# Patient Record
Sex: MALE | Race: BLACK OR AFRICAN AMERICAN | NOT HISPANIC OR LATINO | URBAN - METROPOLITAN AREA
[De-identification: names, ages, dates, MRNs, and addresses within clinical notes are randomized per-mention and may not be internally consistent; named-entity substitution may affect disease eponyms.]

---

## 2020-01-10 ENCOUNTER — OFFICE (OUTPATIENT)
Dept: URBAN - METROPOLITAN AREA CLINIC 19 | Facility: CLINIC | Age: 40
End: 2020-01-10

## 2020-01-10 VITALS
DIASTOLIC BLOOD PRESSURE: 65 MMHG | SYSTOLIC BLOOD PRESSURE: 118 MMHG | HEIGHT: 75 IN | HEART RATE: 67 BPM | WEIGHT: 239 LBS

## 2020-01-10 DIAGNOSIS — K51.011 ULCERATIVE (CHRONIC) PANCOLITIS WITH RECTAL BLEEDING: ICD-10-CM

## 2020-01-10 DIAGNOSIS — R19.4 CHANGE IN BOWEL HABIT: ICD-10-CM

## 2020-01-10 LAB
C-REACTIVE PROTEIN, QUANT: 10 MG/L (ref 0–10)
CBC, PLATELET, NO DIFFERENTIAL: HEMATOCRIT: 43.7 % (ref 37.5–51)
CBC, PLATELET, NO DIFFERENTIAL: HEMOGLOBIN: 14.5 G/DL (ref 13–17.7)
CBC, PLATELET, NO DIFFERENTIAL: MCH: 30.6 PG (ref 26.6–33)
CBC, PLATELET, NO DIFFERENTIAL: MCHC: 33.2 G/DL (ref 31.5–35.7)
CBC, PLATELET, NO DIFFERENTIAL: MCV: 92 FL (ref 79–97)
CBC, PLATELET, NO DIFFERENTIAL: PLATELETS: 199 X10E3/UL (ref 150–450)
CBC, PLATELET, NO DIFFERENTIAL: RBC: 4.74 X10E6/UL (ref 4.14–5.8)
CBC, PLATELET, NO DIFFERENTIAL: RDW: 14.5 % (ref 11.6–15.4)
CBC, PLATELET, NO DIFFERENTIAL: WBC: 6.8 X10E3/UL (ref 3.4–10.8)
COMP. METABOLIC PANEL (14): A/G RATIO: 1.4 (ref 1.2–2.2)
COMP. METABOLIC PANEL (14): ALBUMIN: 4.3 G/DL (ref 3.5–5.5)
COMP. METABOLIC PANEL (14): ALKALINE PHOSPHATASE: 44 IU/L (ref 39–117)
COMP. METABOLIC PANEL (14): ALT (SGPT): 27 IU/L (ref 0–44)
COMP. METABOLIC PANEL (14): AST (SGOT): 21 IU/L (ref 0–40)
COMP. METABOLIC PANEL (14): BILIRUBIN, TOTAL: 0.4 MG/DL (ref 0–1.2)
COMP. METABOLIC PANEL (14): BUN/CREATININE RATIO: 18 (ref 9–20)
COMP. METABOLIC PANEL (14): BUN: 21 MG/DL — HIGH (ref 6–20)
COMP. METABOLIC PANEL (14): CALCIUM: 9.5 MG/DL (ref 8.7–10.2)
COMP. METABOLIC PANEL (14): CARBON DIOXIDE, TOTAL: 23 MMOL/L (ref 20–29)
COMP. METABOLIC PANEL (14): CHLORIDE: 104 MMOL/L (ref 96–106)
COMP. METABOLIC PANEL (14): CREATININE: 1.14 MG/DL (ref 0.76–1.27)
COMP. METABOLIC PANEL (14): EGFR IF AFRICN AM: 93 ML/MIN/1.73 (ref 59–?)
COMP. METABOLIC PANEL (14): EGFR IF NONAFRICN AM: 81 ML/MIN/1.73 (ref 59–?)
COMP. METABOLIC PANEL (14): GLOBULIN, TOTAL: 3.1 G/DL (ref 1.5–4.5)
COMP. METABOLIC PANEL (14): GLUCOSE: 90 MG/DL (ref 65–99)
COMP. METABOLIC PANEL (14): POTASSIUM: 4.8 MMOL/L (ref 3.5–5.2)
COMP. METABOLIC PANEL (14): PROTEIN, TOTAL: 7.4 G/DL (ref 6–8.5)
COMP. METABOLIC PANEL (14): SODIUM: 142 MMOL/L (ref 134–144)
SEDIMENTATION RATE-WESTERGREN: 7 MM/HR (ref 0–15)

## 2020-01-10 PROCEDURE — 99204 OFFICE O/P NEW MOD 45 MIN: CPT | Performed by: INTERNAL MEDICINE

## 2020-01-10 RX ORDER — SODIUM PICOSULFATE, MAGNESIUM OXIDE, AND ANHYDROUS CITRIC ACID 10; 3.5; 12 MG/160ML; G/160ML; G/160ML
LIQUID ORAL
Qty: 1 | Refills: 0 | Status: COMPLETED
Start: 2020-01-10 | End: 2020-01-16

## 2020-01-10 RX ORDER — MESALAMINE 4 G/60ML
4 ENEMA RECTAL
Qty: 15 | Refills: 2 | Status: COMPLETED
Start: 2020-01-10 | End: 2020-04-30

## 2020-01-10 NOTE — SERVICEHPINOTES
39-year-old black male with a history of PACO returns after I last saw him in 2013, complaining of increasing intestinal "gas", increase in stool frequency after eating and rectal bleeding for the past 3 months.  He denies nausea, reflux, dysphagia or melena.  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  Random biopsies documented mild colitis involving the transverse and descending colon.  L-sided PACO was diagnosed initially on colonoscopy 12/16/04 and his clinical course has been quite unremarkable. He has never been on steroids or been hospitalized. He has had only a few minor flareups in the past , which were successfully treated with a short course of Canasa supp. He's never been on any maintenance IBD medication.  He denies any extraintestinal manifestations of IBD.  FHx is negative for IBD or colon neoplasm.

## 2020-01-16 ENCOUNTER — OFFICE (OUTPATIENT)
Dept: URBAN - METROPOLITAN AREA PATHOLOGY 22 | Facility: PATHOLOGY | Age: 40
End: 2020-01-16
Payer: COMMERCIAL

## 2020-01-16 ENCOUNTER — AMBULATORY SURGICAL CENTER (OUTPATIENT)
Dept: URBAN - METROPOLITAN AREA SURGERY 3 | Facility: SURGERY | Age: 40
End: 2020-01-16
Payer: COMMERCIAL

## 2020-01-16 ENCOUNTER — AMBULATORY SURGICAL CENTER (OUTPATIENT)
Dept: URBAN - METROPOLITAN AREA SURGERY 3 | Facility: SURGERY | Age: 40
End: 2020-01-16

## 2020-01-16 VITALS
SYSTOLIC BLOOD PRESSURE: 130 MMHG | DIASTOLIC BLOOD PRESSURE: 70 MMHG | SYSTOLIC BLOOD PRESSURE: 138 MMHG | DIASTOLIC BLOOD PRESSURE: 70 MMHG | OXYGEN SATURATION: 99 % | OXYGEN SATURATION: 99 % | RESPIRATION RATE: 17 BRPM | HEART RATE: 58 BPM | TEMPERATURE: 97.5 F | RESPIRATION RATE: 18 BRPM | HEART RATE: 58 BPM | RESPIRATION RATE: 16 BRPM | DIASTOLIC BLOOD PRESSURE: 85 MMHG | HEIGHT: 75 IN | OXYGEN SATURATION: 100 % | HEART RATE: 62 BPM | SYSTOLIC BLOOD PRESSURE: 147 MMHG | DIASTOLIC BLOOD PRESSURE: 70 MMHG | DIASTOLIC BLOOD PRESSURE: 76 MMHG | WEIGHT: 239 LBS | RESPIRATION RATE: 17 BRPM | OXYGEN SATURATION: 99 % | TEMPERATURE: 97.5 F | DIASTOLIC BLOOD PRESSURE: 85 MMHG | OXYGEN SATURATION: 100 % | HEART RATE: 62 BPM | HEART RATE: 65 BPM | SYSTOLIC BLOOD PRESSURE: 147 MMHG | DIASTOLIC BLOOD PRESSURE: 76 MMHG | RESPIRATION RATE: 16 BRPM | RESPIRATION RATE: 18 BRPM | DIASTOLIC BLOOD PRESSURE: 85 MMHG | DIASTOLIC BLOOD PRESSURE: 87 MMHG | RESPIRATION RATE: 16 BRPM | WEIGHT: 239 LBS | TEMPERATURE: 97.5 F | HEART RATE: 65 BPM | SYSTOLIC BLOOD PRESSURE: 130 MMHG | DIASTOLIC BLOOD PRESSURE: 87 MMHG | SYSTOLIC BLOOD PRESSURE: 138 MMHG | RESPIRATION RATE: 17 BRPM | HEIGHT: 75 IN | HEIGHT: 75 IN | SYSTOLIC BLOOD PRESSURE: 130 MMHG | SYSTOLIC BLOOD PRESSURE: 138 MMHG | WEIGHT: 239 LBS | HEART RATE: 58 BPM | HEART RATE: 65 BPM | SYSTOLIC BLOOD PRESSURE: 147 MMHG | OXYGEN SATURATION: 100 % | RESPIRATION RATE: 18 BRPM | HEART RATE: 62 BPM | DIASTOLIC BLOOD PRESSURE: 76 MMHG | DIASTOLIC BLOOD PRESSURE: 87 MMHG

## 2020-01-16 DIAGNOSIS — K62.5 HEMORRHAGE OF ANUS AND RECTUM: ICD-10-CM

## 2020-01-16 DIAGNOSIS — K51.90 ULCERATIVE COLITIS, UNSPECIFIED, WITHOUT COMPLICATIONS: ICD-10-CM

## 2020-01-16 PROCEDURE — 88342 IMHCHEM/IMCYTCHM 1ST ANTB: CPT | Performed by: INTERNAL MEDICINE

## 2020-01-16 PROCEDURE — 88305 TISSUE EXAM BY PATHOLOGIST: CPT | Performed by: INTERNAL MEDICINE

## 2020-01-16 PROCEDURE — 45380 COLONOSCOPY AND BIOPSY: CPT | Performed by: INTERNAL MEDICINE

## 2020-01-16 PROCEDURE — 88341 IMHCHEM/IMCYTCHM EA ADD ANTB: CPT | Performed by: INTERNAL MEDICINE

## 2020-03-03 PROBLEM — K52.89 INFLAMMATORY BOWEL DISEASE OF THE INTESTINE IF MORE PRECISE DIAGNOSIS OR DETERMINATION OF THE EXTENT/SEVERITY OF ACTIVITY OF DISEASE WILL INFLUENCE IMMEDIATE/FUTURE MANAGEMENT: Status: ACTIVE | Noted: 2020-01-16

## 2020-04-30 ENCOUNTER — OFFICE (OUTPATIENT)
Dept: URBAN - METROPOLITAN AREA TELEHEALTH 10 | Facility: TELEHEALTH | Age: 40
End: 2020-04-30

## 2020-04-30 VITALS — HEIGHT: 75 IN

## 2020-04-30 DIAGNOSIS — K51.011 ULCERATIVE (CHRONIC) PANCOLITIS WITH RECTAL BLEEDING: ICD-10-CM

## 2020-04-30 RX ORDER — MESALAMINE 375 MG/1
1.5 CAPSULE, EXTENDED RELEASE ORAL
Qty: 360 | Refills: 3 | Status: ACTIVE
Start: 2020-01-23

## 2020-04-30 NOTE — SERVICEHPINOTES
40-year-old black male with a history of PACO with [facility] follow-up after starting Apriso 4 PACO.  Colonoscopy 01/16/2020 found moderate proctosigmoiditis.  He was started on Apriso.  He is now asymptomatic ( symptoms of increasing intestinal "gas", increase in stool frequency after eating and rectal bleeding have resolved).  He no longer uses mesalamine enemas.  He is seeing a cardiologist  for the BBB discovered during his colonoscopy.L-sided PACO was diagnosed initially on colonoscopy 12/16/04 and confirmed on colonoscopy 07/30/2013.  His clinical course has been quite unremarkable overall. He has never been on steroids or been hospitalized. He has had only a few minor flareups in the past, which were successfully treated with a short course of Canasa supp. He's never been on any maintenance IBD medication before starting Apriso 3 months ago. He denies any extraintestinal manifestations of IBD. FHx is negative for IBD or colon neoplasm.

## 2021-10-08 ENCOUNTER — OFFICE (OUTPATIENT)
Dept: URBAN - METROPOLITAN AREA CLINIC 19 | Facility: CLINIC | Age: 41
End: 2021-10-08

## 2021-10-08 VITALS — HEIGHT: 75 IN

## 2021-10-08 DIAGNOSIS — K51.319 ULCERATIVE (CHRONIC) RECTOSIGMOIDITIS WITH UNSPECIFIED COMPL: ICD-10-CM

## 2021-10-08 RX ORDER — MESALAMINE 0.38 G/1
CAPSULE, EXTENDED RELEASE ORAL
Qty: 360 | Refills: 4 | Status: ACTIVE

## 2021-10-08 NOTE — SERVICEHPINOTES
41-year-old black male with a history of PACO returns via Telehealth for routine follow-up.  He is asymptomatic and denies any interval history since we last saw him via tele health 4/30/20.  He is on Apriso 4 tabs/D.Colonoscopy 01/16/2020 found moderate proctosigmoiditis. PACO was originally diagnosed on colonoscopy 12/16/04 which found ulcerative colitis in his  ascending and sigmoid colon.  PACO was subsequently confirmed on colonoscopy 7/30/13. He has never been on steroids or been hospitalized. He has had only a few minor flareups in the past, which were successfully treated with a short course of Canasa supp. He's never been on any maintenance IBD medication before starting Apriso in January, 2020. He denies any extraintestinal manifestations of IBD. FHx is negative for IBD or colon neoplasm.

## 2022-05-26 ENCOUNTER — OFFICE (OUTPATIENT)
Dept: URBAN - METROPOLITAN AREA CLINIC 19 | Facility: CLINIC | Age: 42
End: 2022-05-26

## 2022-05-26 VITALS
WEIGHT: 201 LBS | HEIGHT: 75 IN | HEART RATE: 57 BPM | SYSTOLIC BLOOD PRESSURE: 109 MMHG | DIASTOLIC BLOOD PRESSURE: 68 MMHG | OXYGEN SATURATION: 98 %

## 2022-05-26 DIAGNOSIS — K51.319 ULCERATIVE (CHRONIC) RECTOSIGMOIDITIS WITH UNSPECIFIED COMPL: ICD-10-CM

## 2022-05-26 DIAGNOSIS — R19.4 CHANGE IN BOWEL HABIT: ICD-10-CM

## 2022-05-26 LAB
C-REACTIVE PROTEIN, QUANT: 2 MG/L (ref 0–10)
CBC, PLATELET, NO DIFFERENTIAL: HEMATOCRIT: 43.1 % (ref 37.5–51)
CBC, PLATELET, NO DIFFERENTIAL: HEMOGLOBIN: 14.1 G/DL (ref 13–17.7)
CBC, PLATELET, NO DIFFERENTIAL: MCH: 29.9 PG (ref 26.6–33)
CBC, PLATELET, NO DIFFERENTIAL: MCHC: 32.7 G/DL (ref 31.5–35.7)
CBC, PLATELET, NO DIFFERENTIAL: MCV: 92 FL (ref 79–97)
CBC, PLATELET, NO DIFFERENTIAL: PLATELETS: 175 X10E3/UL (ref 150–450)
CBC, PLATELET, NO DIFFERENTIAL: RBC: 4.71 X10E6/UL (ref 4.14–5.8)
CBC, PLATELET, NO DIFFERENTIAL: RDW: 12.3 % (ref 11.6–15.4)
CBC, PLATELET, NO DIFFERENTIAL: WBC: 5.8 X10E3/UL (ref 3.4–10.8)
COMP. METABOLIC PANEL (14): A/G RATIO: 1.9 (ref 1.2–2.2)
COMP. METABOLIC PANEL (14): ALBUMIN: 4.8 G/DL (ref 4–5)
COMP. METABOLIC PANEL (14): ALKALINE PHOSPHATASE: 50 IU/L (ref 44–121)
COMP. METABOLIC PANEL (14): ALT (SGPT): 12 IU/L (ref 0–44)
COMP. METABOLIC PANEL (14): AST (SGOT): 16 IU/L (ref 0–40)
COMP. METABOLIC PANEL (14): BILIRUBIN, TOTAL: 0.3 MG/DL (ref 0–1.2)
COMP. METABOLIC PANEL (14): BUN/CREATININE RATIO: 17 (ref 9–20)
COMP. METABOLIC PANEL (14): BUN: 17 MG/DL (ref 6–24)
COMP. METABOLIC PANEL (14): CALCIUM: 9.7 MG/DL (ref 8.7–10.2)
COMP. METABOLIC PANEL (14): CARBON DIOXIDE, TOTAL: 24 MMOL/L (ref 20–29)
COMP. METABOLIC PANEL (14): CHLORIDE: 103 MMOL/L (ref 96–106)
COMP. METABOLIC PANEL (14): CREATININE: 1 MG/DL (ref 0.76–1.27)
COMP. METABOLIC PANEL (14): EGFR: 96 ML/MIN/1.73 (ref 59–?)
COMP. METABOLIC PANEL (14): GLOBULIN, TOTAL: 2.5 G/DL (ref 1.5–4.5)
COMP. METABOLIC PANEL (14): GLUCOSE: 94 MG/DL (ref 65–99)
COMP. METABOLIC PANEL (14): POTASSIUM: 5.2 MMOL/L (ref 3.5–5.2)
COMP. METABOLIC PANEL (14): PROTEIN, TOTAL: 7.3 G/DL (ref 6–8.5)
COMP. METABOLIC PANEL (14): SODIUM: 141 MMOL/L (ref 134–144)
SEDIMENTATION RATE-WESTERGREN: 2 MM/HR (ref 0–15)

## 2022-05-26 PROCEDURE — 99214 OFFICE O/P EST MOD 30 MIN: CPT

## 2022-05-26 RX ORDER — MESALAMINE 1000 MG/1
SUPPOSITORY RECTAL
Qty: 30 | Refills: 3 | Status: ACTIVE
Start: 2022-05-26

## 2022-05-26 NOTE — SERVICENOTES
The patient's assessment was reviewed with Dr. Cee and a collaborative plan of care was established.

## 2022-05-26 NOTE — SERVICEHPINOTES
42-year-old black male with a history of PACO returns for a possible ulcerative colitis "flare".
justus Bridges was last seen by Dr. Cee via Telehealth for a routine follow-up where he was asymptomatic and denied any interval history since he was last seenvia telehealth 4/30/20. 
justus Bridges returns today after he reports over the last several weeks he has had "softer" stool as well as experiencing incomplete emptying and rectal fullness.  He is on Apriso 4 tablets daily.  This was started in 2020 after his colonoscopy found moderate proctosigmoiditis.  He had been off treatment for 15 years without significant symptoms.  He denies abdominal pain, diarrhea or overt GI bleeding.  He denies any other GI symptoms including reflux, heartburn, nausea, vomiting or dysphagia.  He has used Canasa suppositories in the past which have been helpful.  He has never been on steroids or been hospitalized.br
brColonoscopy 01/16/2020 found moderate proctosigmoiditis. PACO was originally diagnosed on colonoscopy 12/16/04 which found ulcerative colitis in his  ascending and sigmoid colon.  PACO was subsequently confirmed on colonoscopy 7/30/13. He has never been on steroids or been hospitalized. He has had only a few minor flareups in the past, which were successfully treated with a short course of Canasa supp. He had never been on any maintenance IBD medication before starting Apriso in January, 2020. He denies any extraintestinal manifestations of IBD. FHx is negative for IBD or colon neoplasm.

## 2022-05-27 LAB — CALPROTECTIN, FECAL: 16 UG/G (ref 0–120)

## 2023-09-15 ENCOUNTER — OFFICE (OUTPATIENT)
Dept: URBAN - METROPOLITAN AREA CLINIC 19 | Facility: CLINIC | Age: 43
End: 2023-09-15

## 2023-09-15 VITALS
HEART RATE: 60 BPM | WEIGHT: 227 LBS | OXYGEN SATURATION: 98 % | DIASTOLIC BLOOD PRESSURE: 84 MMHG | HEIGHT: 75 IN | SYSTOLIC BLOOD PRESSURE: 132 MMHG

## 2023-09-15 DIAGNOSIS — K51.011 ULCERATIVE (CHRONIC) PANCOLITIS WITH RECTAL BLEEDING: ICD-10-CM

## 2023-09-15 DIAGNOSIS — R19.4 CHANGE IN BOWEL HABIT: ICD-10-CM

## 2023-09-15 DIAGNOSIS — Z87.898 PERSONAL HISTORY OF OTHER SPECIFIED CONDITIONS: ICD-10-CM

## 2023-09-15 PROCEDURE — 99214 OFFICE O/P EST MOD 30 MIN: CPT

## 2023-09-15 RX ORDER — MESALAMINE 1000 MG/1
SUPPOSITORY RECTAL
Qty: 90 | Refills: 2 | Status: ACTIVE

## 2023-09-15 RX ORDER — SODIUM PICOSULFATE, MAGNESIUM OXIDE, AND ANHYDROUS CITRIC ACID 10; 3.5; 12 MG/160ML; G/160ML; G/160ML
LIQUID ORAL
Qty: 350 | Refills: 0 | Status: COMPLETED
Start: 2023-09-15 | End: 2023-11-29

## 2023-09-15 RX ORDER — MESALAMINE 0.38 G/1
CAPSULE, EXTENDED RELEASE ORAL
Qty: 360 | Refills: 4 | Status: ACTIVE

## 2023-09-15 NOTE — SERVICEHPINOTES
43-year-old black male with a history of PACO returns for a possible ulcerative colitis "flare".  
justus jerome I saw him a year ago 5/26/22 for similar complaints.  He does not typically take his mesalamine pills on a regular basis or mesalamine suppositories.  A year ago, he developed "softer" stools as well as complaints of incomplete emptying rectal fullness.  He restarted the oral medication and suppositories and his symptoms resolved by the time I saw him.  Routine blood work as well as fecal calprotectin were normal.
br
justus   He returns today for similar complaints except with more mucus and some blood in his stool.  He again admittedly was not taking his Apriso or suppositories, but when he restarted them, his symptoms resolved.  He denies any recent antibiotic use or exotic travel.  He typically has these "flares" through 4 times a year.  They typically resolve pretty quickly when he restarts his medication.  He denies any other GI symptoms, including reflux, heartburn, nausea, vomiting or dysphagia.He was last seen by Dr. Cee via Telehealth for a routine follow-up where he was asymptomatic and denied any interval history since he was last seen via telehealth 4/30/20. Colonoscopy 01/16/2020 found moderate proctosigmoiditis. PACO was originally diagnosed on colonoscopy 12/16/04 which found ulcerative colitis in his  ascending and sigmoid colon.  PACO was subsequently confirmed on colonoscopy 7/30/13. He has never been on steroids or been hospitalized. He has had only a few minor flareups in the past, which were successfully treated with a short course of Canasa supp. He had never been on any maintenance IBD medication before starting Apriso in January, 2020. He denies any extraintestinal manifestations of IBD. FHx is negative for IBD or colon neoplasm.

## 2023-09-16 LAB
C-REACTIVE PROTEIN, QUANT: 2 MG/L (ref 0–10)
CBC, PLATELET, NO DIFFERENTIAL: HEMATOCRIT: 43.3 % (ref 37.5–51)
CBC, PLATELET, NO DIFFERENTIAL: HEMOGLOBIN: 14.3 G/DL (ref 13–17.7)
CBC, PLATELET, NO DIFFERENTIAL: MCH: 30.4 PG (ref 26.6–33)
CBC, PLATELET, NO DIFFERENTIAL: MCHC: 33 G/DL (ref 31.5–35.7)
CBC, PLATELET, NO DIFFERENTIAL: MCV: 92 FL (ref 79–97)
CBC, PLATELET, NO DIFFERENTIAL: PLATELETS: 180 X10E3/UL (ref 150–450)
CBC, PLATELET, NO DIFFERENTIAL: RBC: 4.7 X10E6/UL (ref 4.14–5.8)
CBC, PLATELET, NO DIFFERENTIAL: RDW: 12.6 % (ref 11.6–15.4)
CBC, PLATELET, NO DIFFERENTIAL: WBC: 7.3 X10E3/UL (ref 3.4–10.8)
COMP. METABOLIC PANEL (14): A/G RATIO: 1.7 (ref 1.2–2.2)
COMP. METABOLIC PANEL (14): ALBUMIN: 4.7 G/DL (ref 4.1–5.1)
COMP. METABOLIC PANEL (14): ALKALINE PHOSPHATASE: 45 IU/L (ref 44–121)
COMP. METABOLIC PANEL (14): ALT (SGPT): 19 IU/L (ref 0–44)
COMP. METABOLIC PANEL (14): AST (SGOT): 18 IU/L (ref 0–40)
COMP. METABOLIC PANEL (14): BILIRUBIN, TOTAL: 0.4 MG/DL (ref 0–1.2)
COMP. METABOLIC PANEL (14): BUN/CREATININE RATIO: 19 (ref 9–20)
COMP. METABOLIC PANEL (14): BUN: 20 MG/DL (ref 6–24)
COMP. METABOLIC PANEL (14): CALCIUM: 9.8 MG/DL (ref 8.7–10.2)
COMP. METABOLIC PANEL (14): CARBON DIOXIDE, TOTAL: 25 MMOL/L (ref 20–29)
COMP. METABOLIC PANEL (14): CHLORIDE: 103 MMOL/L (ref 96–106)
COMP. METABOLIC PANEL (14): CREATININE: 1.05 MG/DL (ref 0.76–1.27)
COMP. METABOLIC PANEL (14): EGFR: 90 ML/MIN/1.73 (ref 59–?)
COMP. METABOLIC PANEL (14): GLOBULIN, TOTAL: 2.7 G/DL (ref 1.5–4.5)
COMP. METABOLIC PANEL (14): GLUCOSE: 93 MG/DL (ref 70–99)
COMP. METABOLIC PANEL (14): POTASSIUM: 5.3 MMOL/L — HIGH (ref 3.5–5.2)
COMP. METABOLIC PANEL (14): PROTEIN, TOTAL: 7.4 G/DL (ref 6–8.5)
COMP. METABOLIC PANEL (14): SODIUM: 140 MMOL/L (ref 134–144)
SEDIMENTATION RATE-WESTERGREN: 2 MM/HR (ref 0–15)

## 2023-09-21 LAB — CALPROTECTIN, FECAL: 45 UG/G (ref 0–120)

## 2023-11-29 ENCOUNTER — AMBULATORY SURGICAL CENTER (OUTPATIENT)
Dept: URBAN - METROPOLITAN AREA SURGERY 2 | Facility: SURGERY | Age: 43
End: 2023-11-29
Payer: COMMERCIAL

## 2023-11-29 VITALS
RESPIRATION RATE: 18 BRPM | SYSTOLIC BLOOD PRESSURE: 133 MMHG | HEIGHT: 75 IN | RESPIRATION RATE: 18 BRPM | SYSTOLIC BLOOD PRESSURE: 155 MMHG | DIASTOLIC BLOOD PRESSURE: 71 MMHG | HEART RATE: 52 BPM | WEIGHT: 236 LBS | HEART RATE: 62 BPM | DIASTOLIC BLOOD PRESSURE: 74 MMHG | DIASTOLIC BLOOD PRESSURE: 98 MMHG | DIASTOLIC BLOOD PRESSURE: 76 MMHG | SYSTOLIC BLOOD PRESSURE: 133 MMHG | TEMPERATURE: 98.3 F | TEMPERATURE: 98.3 F | DIASTOLIC BLOOD PRESSURE: 98 MMHG | DIASTOLIC BLOOD PRESSURE: 71 MMHG | DIASTOLIC BLOOD PRESSURE: 71 MMHG | HEART RATE: 62 BPM | SYSTOLIC BLOOD PRESSURE: 120 MMHG | HEART RATE: 53 BPM | OXYGEN SATURATION: 99 % | DIASTOLIC BLOOD PRESSURE: 73 MMHG | RESPIRATION RATE: 18 BRPM | HEART RATE: 62 BPM | DIASTOLIC BLOOD PRESSURE: 73 MMHG | RESPIRATION RATE: 16 BRPM | OXYGEN SATURATION: 100 % | SYSTOLIC BLOOD PRESSURE: 155 MMHG | HEIGHT: 75 IN | DIASTOLIC BLOOD PRESSURE: 74 MMHG | TEMPERATURE: 97.7 F | HEART RATE: 52 BPM | SYSTOLIC BLOOD PRESSURE: 133 MMHG | WEIGHT: 236 LBS | RESPIRATION RATE: 16 BRPM | SYSTOLIC BLOOD PRESSURE: 125 MMHG | DIASTOLIC BLOOD PRESSURE: 76 MMHG | HEART RATE: 61 BPM | TEMPERATURE: 97.7 F | HEART RATE: 53 BPM | DIASTOLIC BLOOD PRESSURE: 74 MMHG | SYSTOLIC BLOOD PRESSURE: 127 MMHG | HEART RATE: 50 BPM | OXYGEN SATURATION: 100 % | OXYGEN SATURATION: 99 % | SYSTOLIC BLOOD PRESSURE: 120 MMHG | WEIGHT: 236 LBS | SYSTOLIC BLOOD PRESSURE: 125 MMHG | DIASTOLIC BLOOD PRESSURE: 73 MMHG | TEMPERATURE: 98.3 F | DIASTOLIC BLOOD PRESSURE: 76 MMHG | SYSTOLIC BLOOD PRESSURE: 127 MMHG | OXYGEN SATURATION: 100 % | HEART RATE: 50 BPM | HEART RATE: 52 BPM | RESPIRATION RATE: 16 BRPM | HEART RATE: 50 BPM | SYSTOLIC BLOOD PRESSURE: 155 MMHG | SYSTOLIC BLOOD PRESSURE: 125 MMHG | OXYGEN SATURATION: 99 % | SYSTOLIC BLOOD PRESSURE: 120 MMHG | TEMPERATURE: 97.7 F | DIASTOLIC BLOOD PRESSURE: 98 MMHG | HEIGHT: 75 IN | HEART RATE: 61 BPM | HEART RATE: 53 BPM | HEART RATE: 61 BPM | SYSTOLIC BLOOD PRESSURE: 127 MMHG

## 2023-11-29 DIAGNOSIS — K51.90 ULCERATIVE COLITIS, UNSPECIFIED, WITHOUT COMPLICATIONS: ICD-10-CM

## 2023-11-29 PROCEDURE — 45378 DIAGNOSTIC COLONOSCOPY: CPT | Performed by: INTERNAL MEDICINE
